# Patient Record
Sex: FEMALE | Race: WHITE | NOT HISPANIC OR LATINO | Employment: OTHER | URBAN - METROPOLITAN AREA
[De-identification: names, ages, dates, MRNs, and addresses within clinical notes are randomized per-mention and may not be internally consistent; named-entity substitution may affect disease eponyms.]

---

## 2017-02-10 ENCOUNTER — GENERIC CONVERSION - ENCOUNTER (OUTPATIENT)
Dept: OTHER | Facility: OTHER | Age: 62
End: 2017-02-10

## 2017-03-03 ENCOUNTER — GENERIC CONVERSION - ENCOUNTER (OUTPATIENT)
Dept: OTHER | Facility: OTHER | Age: 62
End: 2017-03-03

## 2018-01-11 NOTE — RESULT NOTES
Message   Blood testing looks okay  Verified Results  (1) LIPID PANEL, FASTING [id number] 06:41PM Sheri Obrien   Triglyceride:         Normal              <150 mg/dl       Borderline High    150-199 mg/dl       High               200-499 mg/dl       Very High          >499 mg/dl  Cholesterol:         Desirable        <200 mg/dl      Borderline High  200-239 mg/dl      High             >239 mg/dl  HDL Cholesterol:        High    >59 mg/dL      Low     <41 mg/dL     Test Name Result Flag Reference   CHOLESTEROL 200 mg/dL     HDL,DIRECT 44 mg/dL  40-60   LDL CHOLESTEROL CALCULATED 106 mg/dL H 0-100   TRIGLYCERIDES 248 mg/dL H <=150     (1) CBC/PLT/DIFF [id number] 06:41PM [place]     Test Name Result Flag Reference   WBC COUNT 5 63 Thousand/uL  4 31-10 16   RBC COUNT 4 81 Million/uL  3 81-5 12   HEMOGLOBIN 14 3 g/dL  11 5-15 4   HEMATOCRIT 42 7 %  34 8-46  1   MCV 89 fL  82-98   MCH 29 7 pg  26 8-34 3   MCHC 33 5 g/dL  31 4-37 4   RDW 13 1 %  11 6-15 1   MPV 10 8 fL  8 9-12 7   PLATELET COUNT 200 Thousands/uL  149-390   nRBC AUTOMATED 0 /100 WBCs     NEUTROPHILS RELATIVE PERCENT 61 %  43-75   LYMPHOCYTES RELATIVE PERCENT 30 %  14-44   MONOCYTES RELATIVE PERCENT 6 %  4-12   EOSINOPHILS RELATIVE PERCENT 2 %  0-6   BASOPHILS RELATIVE PERCENT 1 %  0-1   NEUTROPHILS ABSOLUTE COUNT 3 44 Thousands/µL  1 85-7 62   LYMPHOCYTES ABSOLUTE COUNT 1 68 Thousands/µL  0 60-4 47   MONOCYTES ABSOLUTE COUNT 0 35 Thousand/µL  0 17-1 22   EOSINOPHILS ABSOLUTE COUNT 0 11 Thousand/µL  0 00-0 61   BASOPHILS ABSOLUTE COUNT 0 03 Thousands/µL  0 00-0 10     (1) COMPREHENSIVE METABOLIC PANEL [id number] 20:89KU [facility] Disease Education Program recommendations are as follows:  GFR calculation is accurate only with a steady state creatinine  Chronic Kidney disease less than 60 ml/min/1 73 sq  meters  Kidney failure less than 15 ml/min/1 73 sq  meters       Test Name Result Flag Reference   GLUCOSE,RANDM 104 mg/dL     SODIUM 139 mmol/L  136-145   POTASSIUM 4 5 mmol/L  3 5-5 3   CHLORIDE 104 mmol/L  100-108   CARBON DIOXIDE 31 mmol/L  21-32   ANION GAP (CALC) 4 mmol/L  4-13   BLOOD UREA NITROGEN 15 mg/dL  5-25   CREATININE 0 79 mg/dL  0 60-1 30   Standardized to IDMS reference method   CALCIUM 9 3 mg/dL  8 3-10 1   BILI, TOTAL 0 45 mg/dL  0 20-1 00   ALK PHOSPHATAS 62 U/L     ALT (SGPT) 24 U/L  12-78   AST(SGOT) 14 U/L  5-45   ALBUMIN 4 0 g/dL  3 5-5 0   TOTAL PROTEIN 6 9 g/dL  6 4-8 2   eGFR Non-African American      >60 0 ml/min/1 73sq m     COLONOSCOPY 10Aug2015 12:00AM Juanita Romero     Test Name Result Flag Reference   Colonoscopy see chart copy         Plan  Colon cancer screening    · COLONOSCOPY ; every 5 years;  Last [id number]; Status:Active

## 2018-01-12 NOTE — RESULT NOTES
Message   Patient is allergy panel shows a milk allergy  All other allergy testing done appears negative  Verified Results  (1) ALLERGY, FOOD PANEL [id number] 06:41PM Tucker Led   As in all diagnostic testing, a diagnosis should be made by the physician based on both test results and patient clinical history  Test Name Result Flag Reference   FISH COD <0 10 kUA/I  0 00-0 09   EGG WHITE <0 10 kUA/I  0 00-0 09   GLUTEN <0 10 kUA/I  0 00-0 09   MILK 0 24 kUA/I H 0 00-0 09   PEANUT <0 10 kUA/I  0 00-0 09   F041 SALMON <0 10 kUA/I  0 00-0 09   SCALLOP <0 10 kUA/I  0 00-0 09   SESAME <0 10 kUA/I  0 00-0 09   SHRIMP <0 10 kUA/I  0 00-0 09   SOYBEAN <0 10 kUA/I  0 00-0 09   ALLERGEN TUNA (F40) IGE <0 10 kUA/I  0 00-0 09   WALNUT <0 10 kUA/I  0 00-0 09   WHEAT <0 10 kUA/I  0 00-0 09   TOTAL IGE 22 3 kU/l  0-113   ALLERGEN ALMONDS <0 10 kUA/I  0 00-0 09   ALLERGEN CASHEW <0 10 kUA/I  0 00-0 09   ALLERGEN HAZELNUT/FILBERT IGE <0 10 kUA/l  0 00-0 09   ALLERGEN COMMENT See Below       (1) ALLERGY, NORTHEAST PANEL ADULT [id number] 06:41PM Tucker Led   As in all diagnostic testing, a diagnosis should be made by the physician based on both test results and patient clinical history  Test Name Result Flag Reference   ALTERNARIA ALTERNATA <0 10 kUA/I  0 00-0 09   ASPERGILLUS FUMIGATUS <0 10 kUA/I  0 00-0 09   BERMUDA GRASS <0 10 kUA/I  0 00-0 09   ALLERGEN MAPLE/BOX ELDER <0 10 kUA/I  0 00-0 09   ALLERGEN CAT EPITHELIUM-DANDER <0 10 kUA/I  0 00-0 09   CLADOSPORIUM HERBARUM <0 10 kUA/I  0 00-0 09   COCKROACH <0 10 kUA/I  0 00-0 09   ALLERGEN, COMMON SILVER BIRCH <0 10 kUA/I  0 00-0 09   ALLERGEN, COTTONWOOD <0 10 kUA/I  0 00-0 09   D  FARINAE <0 10 kUA/I  0 00-0 09   D   PTERONYSSINUS <0 10 kUA/I  0 00-0 09   DOG DANDER <0 10 kUA/I  0 00-0 09   ALLERGEN ELM <0 10 kUA/I  0 00-0 09   M[place] TREE <0 10 kUA/I  0 00-0 09   MOUSE URINE <0 10 kUA/I  0 00-0 09   ALLERGEN MUGWORT IGE <0 10 kUA/I  0 00-0 09 MULBERRY TREE <0 10 kUA/I  0 00-0 09   ALLERGEN, OAK <0 10 kUA/I  0 00-0 09   PENICILLIUM CHRYSOGENUM <0 10 kUA/I  0 00-0 09   ALLERGEN ROUGH PIGWEED (W14) IGE <0 10 kUA/I  0 00-0 09   COMMON RAGWEED <0 10 kUA/I  0 00-0 09   ALLERGEN SHEEP SORREL (W18) IGE <0 10 kUA/I  0 00-0 09   SYCAMORE TREE <0 10 kUA/I  0 00-0 09   SAMY GRASS <0 10 kUA/I  0 00-0 09   WALNUT TREE <0 10 kUA/I  0 00-0 09   WHITE TANYA TREE <0 10 kUA/I  0 00-0 09   TOTAL IGE 22 3 kU/l  0-113   ALLERGEN COMMENT See Below       (1) ALLERGY, FOOD PANEL [id number] 06:41PM Alli Bryan   ==========================================================================  Alpha-LactalbuminFBeta-LactoglobulinFCaesin F                               (f76)        F        (f77)      F (f78) F                         ==========================================================================        +/-        F         +/-      F   -   F May tolerate baked milk products  Likely to develop tolerance  ---------------------------------------------------------------------------------        +/-        F         +/-      F    +   FHigher risk of reaction to all forms of                        F                  F        Fmilk, including processed products                                                  Less likely to develop tolerance   ==========================================================================  As in all diagnostic testing, a diagnosis should be made by the physician   based on both test results and patient clinical history      ==========================================================================  Alpha-LactalbuminFBeta-LactoglobulinFCaesin F                               (f76)        F        (f77)      F (f78) F                         ==========================================================================        +/-        F         +/-      F   - F May tolerate baked milk products  Likely to develop tolerance  ---------------------------------------------------------------------------------        +/-        F         +/-      F    +   FHigher risk of reaction to all forms of                        F                  F        Fmilk, including processed products  Less likely to develop tolerance   ==========================================================================  As in all diagnostic testing, a diagnosis should be made by the physician   based on both test results and patient clinical history       Test Name Result Flag Reference   ALLERGEN, ALPHA LACTALBUMIN <0 10 kAU/I  0 00-0 09   ALLERGEN, BETA LACTOGLOBULIN <0 10 kAU/I  0 00-0 09   ALLERGEN CASEIN <0 10 kAU/I  0 00-0 09

## 2018-01-15 NOTE — RESULT NOTES
Message   Mammogram is benign  Recommend recheck in one year  Verified Results  * MAMMO SCREENING BILATERAL W CAD [id number] 11:24AM Alesha Catalan     Test Name Result Flag Reference   MAMMO SCREENING BILATERAL W CAD (Report)     Patient History:   Patient is postmenopausal    Family history of breast cancer in maternal aunt under age 48  Benign cyst aspiration of the right breast, January 7, 2005  Patient has never smoked  Patient's BMI is 22 3  Reason for exam: screening (asymptomatic)  Mammo Screening Bilateral W CAD: January 22, 2016 - Check In #:    [de-identified]   Bilateral MLO and CC view(s) were taken  XCCL view(s) were taken   of the left breast      Technologist: ANTHONY Rae (R)(M)   Prior study comparison: January 8, 2015, bilateral digital    screening mammogram, performed at [facility] 112  January 7, 2014, bilateral digital screening    mammogram, performed at [facility] 89  January 3, 2013, bilateral digital screening mammogram,    performed at [facility] 89  December 8, 2011, bilateral digital screening mammogram,    performed at [place] [address]  December 7, 2010, bilateral digital screening mammogram,    performed at [facility] 89  There are scattered fibroglandular densities  The parenchymal pattern appears stable  No dominant soft tissue    mass or suspicious calcifications are noted  There is a stable    patch of asymmetric tissue in the upper posterior left breast     The skin and nipple contours are within normal limits  No mammographic evidence of malignancy  No    significant changes when compared with prior studies  ASSESSMENT: BiRad:2 - Benign     Recommendation:   Routine screening mammogram in 1 year  A reminder letter will be   scheduled     Analyzed by CAD     8-10% of cancers will be missed on mammography  Management of a    palpable abnormality must be based on clinical grounds  Patients   will be notified of their results via letter from our facility  Accredited by [employer] and FDA       Transcription Location: ANTHONY Marti 98: [id number]     Risk Value(s):   Rejier-Cuzick 10 Year: 5 618%, Tyrer-Cuyenifer Lifetime: 12 768%,    Myriad Table: 2 6%, JESSENIA 5 Year: 1 7%, NCI Lifetime: 7 7%   Signed by:   Mark Alex MD   1/22/16

## 2018-03-30 RX ORDER — NAPROXEN 500 MG/1
TABLET ORAL
Qty: 180 TABLET | Refills: 1 | OUTPATIENT
Start: 2018-03-30

## 2018-04-17 ENCOUNTER — OFFICE VISIT (OUTPATIENT)
Dept: FAMILY MEDICINE CLINIC | Facility: CLINIC | Age: 63
End: 2018-04-17
Payer: COMMERCIAL

## 2018-04-17 VITALS
TEMPERATURE: 98 F | WEIGHT: 130 LBS | BODY MASS INDEX: 23.04 KG/M2 | HEIGHT: 63 IN | SYSTOLIC BLOOD PRESSURE: 122 MMHG | DIASTOLIC BLOOD PRESSURE: 72 MMHG

## 2018-04-17 DIAGNOSIS — M19.90 OSTEOARTHRITIS, UNSPECIFIED OSTEOARTHRITIS TYPE, UNSPECIFIED SITE: Primary | ICD-10-CM

## 2018-04-17 DIAGNOSIS — R06.02 SHORTNESS OF BREATH: ICD-10-CM

## 2018-04-17 DIAGNOSIS — Z23 NEED FOR VACCINE FOR TD (TETANUS-DIPHTHERIA): ICD-10-CM

## 2018-04-17 PROBLEM — H40.9 GLAUCOMA: Status: ACTIVE | Noted: 2018-04-17

## 2018-04-17 LAB
ALBUMIN SERPL BCP-MCNC: 4.1 G/DL (ref 3.5–5)
ALP SERPL-CCNC: 65 U/L (ref 46–116)
ALT SERPL W P-5'-P-CCNC: 21 U/L (ref 12–78)
ANION GAP SERPL CALCULATED.3IONS-SCNC: 7 MMOL/L (ref 4–13)
AST SERPL W P-5'-P-CCNC: 14 U/L (ref 5–45)
BASOPHILS # BLD AUTO: 0.08 THOUSANDS/ΜL (ref 0–0.1)
BASOPHILS NFR BLD AUTO: 1 % (ref 0–1)
BILIRUB SERPL-MCNC: 0.49 MG/DL (ref 0.2–1)
BUN SERPL-MCNC: 18 MG/DL (ref 5–25)
CALCIUM SERPL-MCNC: 9.3 MG/DL (ref 8.3–10.1)
CHLORIDE SERPL-SCNC: 103 MMOL/L (ref 100–108)
CHOLEST SERPL-MCNC: 215 MG/DL (ref 50–200)
CO2 SERPL-SCNC: 29 MMOL/L (ref 21–32)
CREAT SERPL-MCNC: 0.68 MG/DL (ref 0.6–1.3)
EOSINOPHIL # BLD AUTO: 0.2 THOUSAND/ΜL (ref 0–0.61)
EOSINOPHIL NFR BLD AUTO: 3 % (ref 0–6)
ERYTHROCYTE [DISTWIDTH] IN BLOOD BY AUTOMATED COUNT: 13.2 % (ref 11.6–15.1)
GFR SERPL CREATININE-BSD FRML MDRD: 93 ML/MIN/1.73SQ M
GLUCOSE P FAST SERPL-MCNC: 78 MG/DL (ref 65–99)
HCT VFR BLD AUTO: 44.6 % (ref 34.8–46.1)
HDLC SERPL-MCNC: 37 MG/DL (ref 40–60)
HGB BLD-MCNC: 14.7 G/DL (ref 11.5–15.4)
LYMPHOCYTES # BLD AUTO: 2.24 THOUSANDS/ΜL (ref 0.6–4.47)
LYMPHOCYTES NFR BLD AUTO: 30 % (ref 14–44)
MCH RBC QN AUTO: 30.1 PG (ref 26.8–34.3)
MCHC RBC AUTO-ENTMCNC: 33 G/DL (ref 31.4–37.4)
MCV RBC AUTO: 91 FL (ref 82–98)
MONOCYTES # BLD AUTO: 0.51 THOUSAND/ΜL (ref 0.17–1.22)
MONOCYTES NFR BLD AUTO: 7 % (ref 4–12)
NEUTROPHILS # BLD AUTO: 4.54 THOUSANDS/ΜL (ref 1.85–7.62)
NEUTS SEG NFR BLD AUTO: 59 % (ref 43–75)
NONHDLC SERPL-MCNC: 178 MG/DL
NRBC BLD AUTO-RTO: 0 /100 WBCS
PLATELET # BLD AUTO: 326 THOUSANDS/UL (ref 149–390)
PMV BLD AUTO: 10.7 FL (ref 8.9–12.7)
POTASSIUM SERPL-SCNC: 4.2 MMOL/L (ref 3.5–5.3)
PROT SERPL-MCNC: 7.2 G/DL (ref 6.4–8.2)
RBC # BLD AUTO: 4.89 MILLION/UL (ref 3.81–5.12)
SODIUM SERPL-SCNC: 139 MMOL/L (ref 136–145)
TRIGL SERPL-MCNC: 438 MG/DL
TSH SERPL DL<=0.05 MIU/L-ACNC: 1.48 UIU/ML (ref 0.36–3.74)
WBC # BLD AUTO: 7.6 THOUSAND/UL (ref 4.31–10.16)

## 2018-04-17 PROCEDURE — 80053 COMPREHEN METABOLIC PANEL: CPT | Performed by: FAMILY MEDICINE

## 2018-04-17 PROCEDURE — 84443 ASSAY THYROID STIM HORMONE: CPT | Performed by: FAMILY MEDICINE

## 2018-04-17 PROCEDURE — 99214 OFFICE O/P EST MOD 30 MIN: CPT | Performed by: FAMILY MEDICINE

## 2018-04-17 PROCEDURE — 80061 LIPID PANEL: CPT | Performed by: FAMILY MEDICINE

## 2018-04-17 PROCEDURE — 90715 TDAP VACCINE 7 YRS/> IM: CPT

## 2018-04-17 PROCEDURE — 90471 IMMUNIZATION ADMIN: CPT

## 2018-04-17 PROCEDURE — 85025 COMPLETE CBC W/AUTO DIFF WBC: CPT | Performed by: FAMILY MEDICINE

## 2018-04-17 PROCEDURE — 3008F BODY MASS INDEX DOCD: CPT | Performed by: FAMILY MEDICINE

## 2018-04-17 PROCEDURE — 36415 COLL VENOUS BLD VENIPUNCTURE: CPT | Performed by: FAMILY MEDICINE

## 2018-04-17 RX ORDER — NAPROXEN 500 MG/1
500 TABLET ORAL 2 TIMES DAILY WITH MEALS
Qty: 180 TABLET | Refills: 3 | Status: SHIPPED | OUTPATIENT
Start: 2018-04-17

## 2018-04-17 RX ORDER — ALBUTEROL SULFATE 90 UG/1
2 AEROSOL, METERED RESPIRATORY (INHALATION) EVERY 6 HOURS PRN
Qty: 1 INHALER | Refills: 1 | Status: SHIPPED | OUTPATIENT
Start: 2018-04-17

## 2018-04-17 RX ORDER — NAPROXEN 500 MG/1
TABLET ORAL
COMMUNITY
Start: 2017-12-11 | End: 2018-04-17 | Stop reason: SDUPTHER

## 2018-04-17 RX ORDER — OYSTER SHELL CALCIUM WITH VITAMIN D 500; 200 MG/1; [IU]/1
TABLET, FILM COATED ORAL
COMMUNITY

## 2018-04-17 NOTE — PROGRESS NOTES
Assessment/Plan:  Patient is here today for recheck  Recommended testing for shortness of breath  She will call if symptoms persist or worsen in the interim  Await blood test as well  Recommended Proventil inhaler to be used 2 puffs every 6 hr as needed  She will continue to follow up with ophthalmologist regarding her glaucoma  She is up-to-date on mammography  No problem-specific Assessment & Plan notes found for this encounter  Diagnoses and all orders for this visit:    Osteoarthritis, unspecified osteoarthritis type, unspecified site  -     naproxen (NAPROSYN) 500 mg tablet; Take 1 tablet (500 mg total) by mouth 2 (two) times a day with meals  -     CBC and differential  -     Comprehensive metabolic panel  -     Lipid Panel with Direct LDL reflex  -     TSH, 3rd generation with T4 reflex; Future    Shortness of breath  -     Spirometry pre and post bronchodilator  -     XR chest pa & lateral; Future  -     albuterol (PROAIR HFA) 90 mcg/act inhaler; Inhale 2 puffs every 6 (six) hours as needed for wheezing  -     CBC and differential  -     Comprehensive metabolic panel  -     Lipid Panel with Direct LDL reflex  -     TSH, 3rd generation with T4 reflex; Future    Need for vaccine for Td (tetanus-diphtheria)  -     Tdap vaccine greater than or equal to 6yo IM    Other orders  -     calcium-vitamin D (CALCIUM 500/D) 500 mg-200 units per tablet; Take by mouth  -     Multiple Vitamin (MULTIVITAMINS PO); Take by mouth  -     Discontinue: naproxen (NAPROSYN) 500 mg tablet; 1 TABLET EVERY 12 HOURS AS NEEDED FOR PAIN  -     bimatoprost (LUMIGAN) 0 01 % ophthalmic drops; Administer 1 drop to both eyes daily at bedtime          Subjective:      Patient ID: Ramesh Jessica is a 57 y o  female  Patient here for recheck on chronic conditions  She is due for some blood testing  She also notes occasional shortness of breath that mostly occurs at rest and at night  Denies any chest pain    Shortness of breath is very mild  Denies any palpitations  No edema or weight changes  Her sister and mother have history of asthma  The following portions of the patient's history were reviewed and updated as appropriate: allergies, current medications, past family history, past medical history, past social history, past surgical history and problem list     Review of Systems   Constitutional: Negative  HENT: Negative  Eyes: Negative  Respiratory: Positive for shortness of breath  Cardiovascular: Negative  Gastrointestinal: Negative  Endocrine: Negative  Genitourinary: Negative  Musculoskeletal: Negative  Skin: Negative  Allergic/Immunologic: Negative  Neurological: Negative  Hematological: Negative  Psychiatric/Behavioral: Negative  Objective:      /72   Temp 98 °F (36 7 °C)   Ht 5' 3 39" (1 61 m)   Wt 59 kg (130 lb)   BMI 22 75 kg/m²          Physical Exam   Constitutional: She is oriented to person, place, and time  She appears well-developed and well-nourished  HENT:   Head: Normocephalic and atraumatic  Right Ear: External ear normal  Tympanic membrane is not erythematous and not bulging  Left Ear: External ear normal  Tympanic membrane is not erythematous and not bulging  Nose: Nose normal    Mouth/Throat: Oropharynx is clear and moist and mucous membranes are normal  No oral lesions  No oropharyngeal exudate  Eyes: Conjunctivae and EOM are normal  Right eye exhibits no discharge  Left eye exhibits no discharge  No scleral icterus  Neck: Normal range of motion  Neck supple  No thyromegaly present  Cardiovascular: Normal rate, regular rhythm and normal heart sounds  Exam reveals no gallop and no friction rub  No murmur heard  Pulmonary/Chest: Effort normal  No respiratory distress  She has no wheezes  She has no rales  She exhibits no tenderness  Abdominal: Soft  Bowel sounds are normal  She exhibits no distension and no mass   There is no tenderness  There is no rebound and no guarding  Musculoskeletal: Normal range of motion  She exhibits no edema, tenderness or deformity  Lymphadenopathy:     She has no cervical adenopathy  Neurological: She is alert and oriented to person, place, and time  She has normal reflexes  No cranial nerve deficit  She exhibits normal muscle tone  Coordination normal    Skin: Skin is warm and dry  No rash noted  No erythema  No pallor  Psychiatric: She has a normal mood and affect  Her behavior is normal    Vitals reviewed

## 2019-11-11 ENCOUNTER — TELEPHONE (OUTPATIENT)
Dept: FAMILY MEDICINE CLINIC | Facility: CLINIC | Age: 64
End: 2019-11-11

## 2019-11-11 NOTE — TELEPHONE ENCOUNTER
Received a signed request for release of health information requesting that our office release all medical records from [id number] to [facility] in [address]  Request was sent to [facility] for processing on 11/8/19

## 2021-03-10 DIAGNOSIS — Z23 ENCOUNTER FOR IMMUNIZATION: ICD-10-CM
